# Patient Record
Sex: MALE | Race: BLACK OR AFRICAN AMERICAN | NOT HISPANIC OR LATINO | URBAN - METROPOLITAN AREA
[De-identification: names, ages, dates, MRNs, and addresses within clinical notes are randomized per-mention and may not be internally consistent; named-entity substitution may affect disease eponyms.]

---

## 2020-03-19 ENCOUNTER — COMMUNICATION - HEALTHEAST (OUTPATIENT)
Dept: SCHEDULING | Facility: CLINIC | Age: 25
End: 2020-03-19

## 2021-06-07 NOTE — TELEPHONE ENCOUNTER
Coronavirus (COVID-19) Notification    Reason for call  Notify of Negative COVID-19 lab result, assess symptoms,  review [facility] recommendations    Lab Result    Lab test 2019-nCoV rRt-PCR - IN PROCESS  Patient informed results are still in process, we will notify them once they are received.      {Name]  Tammie F Severson LPN

## 2021-06-07 NOTE — TELEPHONE ENCOUNTER
Coronavirus (COVID-19) Notification    Reason for call  Notify of Negative COVID-19 lab result, assess symptoms,  review [facility] recommendations    Lab Result    Lab test 2019-nCoV rRt-PCR  Oropharyngeal AND/OR nasopharyngeal swabs were NEGATIVE for 2019-nCoV RNA    RN Recommendations/Instructions per [facility]  Patient notified of Negative COVID-19.    Patient can discontinue Quarantee and is free to resume normal activites.  If Patient has questions that you are not able to answer they can contact PCP or MD hotline ([phone])    Please Contact your PCP clinic or return to the Emergency department if your:    Symptoms worsen or other concerning symptom's.      {Name]  Leif Rodriguez RN  [facility]  Emergency Dept Lab Result RN  Ph# [phone]